# Patient Record
Sex: MALE | Race: WHITE | NOT HISPANIC OR LATINO | URBAN - METROPOLITAN AREA
[De-identification: names, ages, dates, MRNs, and addresses within clinical notes are randomized per-mention and may not be internally consistent; named-entity substitution may affect disease eponyms.]

---

## 2019-07-08 ENCOUNTER — OFFICE VISIT (OUTPATIENT)
Dept: INTERNAL MEDICINE CLINIC | Facility: CLINIC | Age: 19
End: 2019-07-08
Payer: COMMERCIAL

## 2019-07-08 VITALS
WEIGHT: 144 LBS | OXYGEN SATURATION: 98 % | SYSTOLIC BLOOD PRESSURE: 120 MMHG | DIASTOLIC BLOOD PRESSURE: 70 MMHG | HEART RATE: 92 BPM | BODY MASS INDEX: 21.82 KG/M2 | HEIGHT: 68 IN | TEMPERATURE: 98.3 F

## 2019-07-08 DIAGNOSIS — R59.9 LYMPH NODE ENLARGEMENT: ICD-10-CM

## 2019-07-08 DIAGNOSIS — Z23 NEED FOR DIPHTHERIA-TETANUS-PERTUSSIS (TDAP) VACCINE: Primary | ICD-10-CM

## 2019-07-08 PROCEDURE — 99202 OFFICE O/P NEW SF 15 MIN: CPT | Performed by: INTERNAL MEDICINE

## 2019-07-08 PROCEDURE — 1036F TOBACCO NON-USER: CPT | Performed by: INTERNAL MEDICINE

## 2019-07-08 PROCEDURE — 3008F BODY MASS INDEX DOCD: CPT | Performed by: INTERNAL MEDICINE

## 2019-07-08 NOTE — PROGRESS NOTES
Assessment/Plan:    Lymph node enlargement  Will check a CBC with differential, CMP and sed rate  Diagnoses and all orders for this visit:    Need for diphtheria-tetanus-pertussis (Tdap) vaccine    Lymph node enlargement  -     CBC and differential; Future  -     Comprehensive metabolic panel; Future  -     C-reactive protein; Future    Other orders  -     Cancel: TDAP VACCINE GREATER THAN OR EQUAL TO 8YO IM          Subjective:      Patient ID: Zee Farrell is a 22 y o  male  The patient presents for evaluation of a solitary left-sided, posterior cervical lymph node which has been present for many years  It has never been tender  It is not enlarging  The size waxes and wanes from time to time  Presently it is barely noticeable  The patient has not noted any other lymphadenopathy in the area  He denies any sore throats  He has had no earaches  He denies fevers  He denies night sweats  He has lost some weight over the past year but this was intentional   He denies any cough or chest pain  No abdominal discomfort, changes in bowel habits, nausea, vomiting or diarrhea  No joint pains  No myalgias  No change in appetite        Family History   Problem Relation Age of Onset    Hypertension Mother     Hashimoto's thyroiditis Mother     Anxiety disorder Mother     Fibromyalgia Mother     Asthma Mother     Asthma Father     Hyperlipidemia Father     Hypothyroidism Maternal Grandmother     Heart failure Maternal Grandmother     Vasculitis Maternal Grandmother     Hypertension Maternal Grandfather     Diabetes Maternal Grandfather     Diabetes Paternal Grandmother     Hypertension Paternal Grandmother     Cancer Paternal Grandfather         Unspecified     Social History     Socioeconomic History    Marital status: Single     Spouse name: Not on file    Number of children: Not on file    Years of education: Not on file    Highest education level: Not on file   Occupational History    Not on file   Social Needs    Financial resource strain: Not on file    Food insecurity:     Worry: Not on file     Inability: Not on file    Transportation needs:     Medical: Not on file     Non-medical: Not on file   Tobacco Use    Smoking status: Never Smoker    Smokeless tobacco: Never Used   Substance and Sexual Activity    Alcohol use: Never     Frequency: Never    Drug use: Never    Sexual activity: Not on file   Lifestyle    Physical activity:     Days per week: Not on file     Minutes per session: Not on file    Stress: Not on file   Relationships    Social connections:     Talks on phone: Not on file     Gets together: Not on file     Attends Bahai service: Not on file     Active member of club or organization: Not on file     Attends meetings of clubs or organizations: Not on file     Relationship status: Not on file    Intimate partner violence:     Fear of current or ex partner: Not on file     Emotionally abused: Not on file     Physically abused: Not on file     Forced sexual activity: Not on file   Other Topics Concern    Not on file   Social History Narrative    Not on file     History reviewed  No pertinent past medical history  No current outpatient medications on file  Allergies   Allergen Reactions    Cephalexin Hives     Past Surgical History:   Procedure Laterality Date    NO PAST SURGERIES           Review of Systems   Constitutional: Negative  HENT: Negative  Eyes: Negative  Respiratory: Negative  Cardiovascular: Negative  Gastrointestinal: Negative  Genitourinary: Negative  Musculoskeletal: Negative  Skin: Negative  Allergic/Immunologic: Negative  Neurological: Negative  Hematological: Positive for adenopathy (Solitary posterior cervical lymph node on the left side)  Psychiatric/Behavioral: Negative            Objective:      /70 (BP Location: Left arm, Patient Position: Sitting, Cuff Size: Standard)   Pulse 92 Temp 98 3 °F (36 8 °C) (Oral)   Ht 5' 8" (1 727 m)   Wt 65 3 kg (144 lb)   SpO2 98%   BMI 21 90 kg/m²          Physical Exam   Constitutional: He is oriented to person, place, and time  He appears well-developed and well-nourished  No distress  HENT:   Head: Normocephalic and atraumatic  Right Ear: External ear normal    Left Ear: External ear normal    Mouth/Throat: Oropharynx is clear and moist    Eyes: Pupils are equal, round, and reactive to light  Conjunctivae are normal  No scleral icterus  Neck: Neck supple  No JVD present  No tracheal deviation present  No thyromegaly present  Cardiovascular: Normal rate and regular rhythm  Pulmonary/Chest: Effort normal  No respiratory distress  Abdominal: Soft  He exhibits no distension  There is no tenderness  Musculoskeletal: Normal range of motion  He exhibits no edema or deformity  Lymphadenopathy:     He has cervical adenopathy (A solitary posterior cervical lymph node located at the scalp line on the left side  Lymph node approximately 5 mm in diameter)  Neurological: He is alert and oriented to person, place, and time  No focal deficits   Skin: Skin is warm and dry  He is not diaphoretic  Psychiatric: He has a normal mood and affect  His behavior is normal    Vitals reviewed

## 2020-03-31 ENCOUNTER — TELEMEDICINE (OUTPATIENT)
Dept: INTERNAL MEDICINE CLINIC | Facility: CLINIC | Age: 20
End: 2020-03-31
Payer: COMMERCIAL

## 2020-03-31 DIAGNOSIS — J45.20 MILD INTERMITTENT ASTHMA WITHOUT COMPLICATION: Primary | ICD-10-CM

## 2020-03-31 PROCEDURE — 99213 OFFICE O/P EST LOW 20 MIN: CPT | Performed by: INTERNAL MEDICINE

## 2020-03-31 PROCEDURE — 3008F BODY MASS INDEX DOCD: CPT | Performed by: INTERNAL MEDICINE

## 2020-03-31 RX ORDER — ALBUTEROL SULFATE 2.5 MG/3ML
2.5 SOLUTION RESPIRATORY (INHALATION) EVERY 6 HOURS PRN
Qty: 100 VIAL | Refills: 0 | Status: SHIPPED | OUTPATIENT
Start: 2020-03-31

## 2020-07-31 VITALS — WEIGHT: 144 LBS | BODY MASS INDEX: 21.82 KG/M2 | HEIGHT: 68 IN

## 2021-01-20 ENCOUNTER — TELEPHONE (OUTPATIENT)
Dept: INTERNAL MEDICINE CLINIC | Facility: CLINIC | Age: 21
End: 2021-01-20

## 2021-01-20 NOTE — TELEPHONE ENCOUNTER
Left message on machine for patient to call me at [place] office  patient due for a follow up for asthma        Please schedule with Dr Maurice Steward

## 2021-01-29 ENCOUNTER — OFFICE VISIT (OUTPATIENT)
Dept: INTERNAL MEDICINE CLINIC | Facility: CLINIC | Age: 21
End: 2021-01-29
Payer: COMMERCIAL

## 2021-01-29 VITALS
HEART RATE: 116 BPM | SYSTOLIC BLOOD PRESSURE: 150 MMHG | OXYGEN SATURATION: 99 % | BODY MASS INDEX: 22.88 KG/M2 | DIASTOLIC BLOOD PRESSURE: 70 MMHG | TEMPERATURE: 99 F | HEIGHT: 67 IN | WEIGHT: 145.8 LBS

## 2021-01-29 DIAGNOSIS — Z02.4 DRIVER'S PERMIT PHYSICAL EXAMINATION: Primary | ICD-10-CM

## 2021-01-29 DIAGNOSIS — R00.0 SINUS TACHYCARDIA: ICD-10-CM

## 2021-01-29 DIAGNOSIS — R03.0 ELEVATED BLOOD PRESSURE READING IN OFFICE WITHOUT DIAGNOSIS OF HYPERTENSION: ICD-10-CM

## 2021-01-29 PROCEDURE — 99173 VISUAL ACUITY SCREEN: CPT | Performed by: INTERNAL MEDICINE

## 2021-01-29 PROCEDURE — 99385 PREV VISIT NEW AGE 18-39: CPT | Performed by: INTERNAL MEDICINE

## 2021-01-29 NOTE — ASSESSMENT & PLAN NOTE
Patient does not have any physical condition that would prevent him from maintaining control of a motor vehicle  Patient noted to be mildly anxious  Blood pressure was elevated at 150/70  Tachycardic ranging 100 to 120   Otherwise asymptomatic

## 2021-01-29 NOTE — PROGRESS NOTES
Assessment/Plan:    's permit physical examination   Patient does not have any physical condition that would prevent him from maintaining control of a motor vehicle  Patient noted to be mildly anxious  Blood pressure was elevated at 150/70  Tachycardic ranging 100 to 120  Otherwise asymptomatic    Sinus tachycardia    Persistent tachycardia throughout the examination heart rate noted to be between 100 and 120    Elevated blood pressure reading in office without diagnosis of hypertension   Blood pressure reading repeated at several intervals consistently reading 150/70       Diagnoses and all orders for this visit:    's permit physical examination    Sinus tachycardia    Elevated blood pressure reading in office without diagnosis of hypertension          Subjective:      Patient ID: Davis Wan is a 23 y o  male  Patient presents for a 's permit examination  He offers no complaints  He has no medical history with conditions known to cause loss of consciousness nor does he have any other condition that would prevent him from controlling a motor vehicle  Denies chest pain  Denies palpitations  Denies dyspnea  No history of vertigo, headaches, visual disturbances, lightheadedness or syncope        Family History   Problem Relation Age of Onset    Hypertension Mother     Hashimoto's thyroiditis Mother     Anxiety disorder Mother     Fibromyalgia Mother     Asthma Mother     Asthma Father     Hyperlipidemia Father     Hypothyroidism Maternal Grandmother     Heart failure Maternal Grandmother     Vasculitis Maternal Grandmother     Hypertension Maternal Grandfather     Diabetes Maternal Grandfather     Diabetes Paternal Grandmother     Hypertension Paternal Grandmother     Cancer Paternal Grandfather         Unspecified     Social History     Socioeconomic History    Marital status: Single     Spouse name: Not on file    Number of children: Not on file    Years of education: Not on file    Highest education level: Not on file   Occupational History    Not on file   Social Needs    Financial resource strain: Not on file    Food insecurity     Worry: Not on file     Inability: Not on file    Transportation needs     Medical: Not on file     Non-medical: Not on file   Tobacco Use    Smoking status: Never Smoker    Smokeless tobacco: Never Used   Substance and Sexual Activity    Alcohol use: Never     Frequency: Never    Drug use: Never    Sexual activity: Not on file   Lifestyle    Physical activity     Days per week: Not on file     Minutes per session: Not on file    Stress: Not on file   Relationships    Social connections     Talks on phone: Not on file     Gets together: Not on file     Attends Confucianism service: Not on file     Active member of club or organization: Not on file     Attends meetings of clubs or organizations: Not on file     Relationship status: Not on file    Intimate partner violence     Fear of current or ex partner: Not on file     Emotionally abused: Not on file     Physically abused: Not on file     Forced sexual activity: Not on file   Other Topics Concern    Not on file   Social History Narrative    Not on file     Past Medical History:   Diagnosis Date    Allergic        Current Outpatient Medications:     albuterol (2 5 mg/3 mL) 0 083 % nebulizer solution, Take 1 vial (2 5 mg total) by nebulization every 6 (six) hours as needed for wheezing or shortness of breath, Disp: 100 vial, Rfl: 0  Allergies   Allergen Reactions    Cephalexin Hives     Past Surgical History:   Procedure Laterality Date    NO PAST SURGERIES           Review of Systems   Constitutional: Negative  HENT: Negative  Eyes: Negative  Respiratory: Negative  Cardiovascular: Negative  Gastrointestinal: Negative  Endocrine: Negative  Genitourinary: Negative  Musculoskeletal: Negative  Skin: Negative  Allergic/Immunologic: Negative  Neurological: Negative  Hematological: Negative  Psychiatric/Behavioral: Negative  Objective:      /70 (BP Location: Left arm, Patient Position: Supine, Cuff Size: Standard)   Pulse (!) 116   Temp 99 °F (37 2 °C) (Tympanic)   Ht 5' 7 25" (1 708 m)   Wt 66 1 kg (145 lb 12 8 oz)   SpO2 99% Comment: Room Air  BMI 22 67 kg/m²          Physical Exam  Vitals signs reviewed  Constitutional:       General: He is not in acute distress  Appearance: Normal appearance  He is normal weight  He is not ill-appearing or toxic-appearing  HENT:      Head: Normocephalic and atraumatic  Right Ear: External ear normal       Left Ear: External ear normal       Nose: Nose normal    Eyes:      General: No scleral icterus  Conjunctiva/sclera: Conjunctivae normal       Pupils: Pupils are equal, round, and reactive to light  Neck:      Musculoskeletal: Neck supple  No neck rigidity  Vascular: No carotid bruit  Cardiovascular:      Rate and Rhythm: Regular rhythm  Tachycardia present  Pulses: Normal pulses  Heart sounds: Normal heart sounds  No murmur  Pulmonary:      Effort: Pulmonary effort is normal  No respiratory distress  Breath sounds: Normal breath sounds  Abdominal:      General: Abdomen is flat  Bowel sounds are normal  There is no distension  Tenderness: There is no abdominal tenderness  There is no guarding  Musculoskeletal:         General: No swelling or tenderness  Right lower leg: No edema  Left lower leg: No edema  Lymphadenopathy:      Cervical: No cervical adenopathy  Skin:     General: Skin is warm  Capillary Refill: Capillary refill takes less than 2 seconds  Coloration: Skin is not jaundiced  Findings: No erythema or rash  Neurological:      General: No focal deficit present  Mental Status: He is alert and oriented to person, place, and time  Mental status is at baseline     Psychiatric:         Mood and Affect: Mood normal          Behavior: Behavior normal          Thought Content:  Thought content normal          Judgment: Judgment normal

## 2022-08-12 ENCOUNTER — TELEPHONE (OUTPATIENT)
Dept: INTERNAL MEDICINE CLINIC | Facility: CLINIC | Age: 22
End: 2022-08-12

## 2022-08-12 NOTE — TELEPHONE ENCOUNTER
Patient would like to seen for Virtual appt for COVID symptoms, I advised him that there weren't any appointments today but he is more than welcome to be seen by an urgent care or emergency room  As patient's parents wanted a script to be sent for RX paxloid  Which I did advised them patient must be evaluated before prescribing anything

## 2022-08-12 NOTE — TELEPHONE ENCOUNTER
I discussed with his mother, patient would need to have an appointment to evaluate his symptoms as well as if he would be a candidate for the Paxlovid  There are no appointments available this afternoon, he is still within his 5 days on Monday if he would be a candidate    Advised to follow-up with urgent care or the ED if symptoms worsen over the weekend

## 2022-10-12 PROBLEM — Z02.4 DRIVER'S PERMIT PHYSICAL EXAMINATION: Status: RESOLVED | Noted: 2021-01-29 | Resolved: 2022-10-12

## 2023-04-28 ENCOUNTER — TRANSITIONAL CARE MANAGEMENT (OUTPATIENT)
Dept: INTERNAL MEDICINE CLINIC | Facility: OTHER | Age: 23
End: 2023-04-28

## 2023-04-28 ENCOUNTER — TELEPHONE (OUTPATIENT)
Dept: INTERNAL MEDICINE CLINIC | Facility: OTHER | Age: 23
End: 2023-04-28

## 2023-05-30 ENCOUNTER — OFFICE VISIT (OUTPATIENT)
Dept: INTERNAL MEDICINE CLINIC | Facility: CLINIC | Age: 23
End: 2023-05-30

## 2023-05-30 VITALS
WEIGHT: 141.6 LBS | HEART RATE: 128 BPM | SYSTOLIC BLOOD PRESSURE: 140 MMHG | DIASTOLIC BLOOD PRESSURE: 80 MMHG | OXYGEN SATURATION: 99 % | HEIGHT: 68 IN | TEMPERATURE: 99.3 F | BODY MASS INDEX: 21.46 KG/M2

## 2023-05-30 DIAGNOSIS — K21.9 GASTROESOPHAGEAL REFLUX DISEASE WITHOUT ESOPHAGITIS: ICD-10-CM

## 2023-05-30 DIAGNOSIS — R59.9 LYMPH NODE ENLARGEMENT: ICD-10-CM

## 2023-05-30 DIAGNOSIS — R00.0 SINUS TACHYCARDIA: Primary | ICD-10-CM

## 2023-05-30 DIAGNOSIS — R03.0 ELEVATED BLOOD PRESSURE READING IN OFFICE WITHOUT DIAGNOSIS OF HYPERTENSION: ICD-10-CM

## 2023-05-30 RX ORDER — HYDROXYZINE HYDROCHLORIDE 25 MG/1
25 TABLET, FILM COATED ORAL EVERY 6 HOURS PRN
Qty: 10 TABLET | Refills: 0 | Status: SHIPPED | OUTPATIENT
Start: 2023-05-30

## 2023-05-30 RX ORDER — FAMOTIDINE 20 MG/1
20 TABLET, FILM COATED ORAL 2 TIMES DAILY
Qty: 60 TABLET | Refills: 0 | Status: SHIPPED | OUTPATIENT
Start: 2023-05-30

## 2023-05-30 RX ORDER — LORAZEPAM 0.5 MG/1
0.5 TABLET ORAL EVERY 8 HOURS PRN
COMMUNITY
Start: 2023-04-27 | End: 2023-05-30

## 2023-05-30 NOTE — PROGRESS NOTES
Assessment/Plan:    Lymph node enlargement  Will get blood work and [address]  Sinus tachycardia  Continues with tachycardia, has seen cardiology  Had echo stress test and 48 hour Holter monitor  Consider beta blocker  Will repeat blood work  Start Atarax as needed for anxiety  Elevated blood pressure reading in office without diagnosis of hypertension  Continue to monitor, may consider beta blocker  Diagnoses and all orders for this visit:    Sinus tachycardia  -     CBC and differential  -     Comprehensive metabolic panel; Future  -     Lipid panel  -     TSH, 3rd generation with Free T4 reflex  -     Hemoglobin A1C  -     Thyroid Antibodies Panel; Future  -     hydrOXYzine HCL (ATARAX) 25 mg tablet; Take 1 tablet (25 mg total) by mouth every 6 (six) hours as needed for anxiety    Lymph node enlargement  -     US head neck soft tissue; Future    Gastroesophageal reflux disease without esophagitis  -     famotidine (PEPCID) 20 mg tablet; Take 1 tablet (20 mg total) by mouth 2 (two) times a day    Elevated blood pressure reading in office without diagnosis of hypertension    Other orders  -     Discontinue: LORazepam (ATIVAN) 0 5 mg tablet; Take 0 5 mg by mouth every 8 (eight) hours as needed          Subjective:      Patient ID: Rowan Vieira is a 20 y o  male  Patient presents today to follow-up from ED visit on 4/26, he was seen for chest discomfort and tachycardia  He has been since evaluated by cardiology in May, he had a stress test and Holter monitor and was advised to come back to cardiology only as needed  IMPRESSION of holtor monitor      Predominant rhythm was sinus rhythm with an average heart rate of 79 bpm    Rare PVC  Rare PACs  No symptoms were reported        He reports that he gets lightheaded or disoriented with just light exertion     Denies tobacco abuse   Denies alcohol abuse   Denies illegal drug abuse      The following portions of the patient's history were reviewed and updated as appropriate: allergies, current medications, past family history, past medical history, past social history, past surgical history and problem list     Review of Systems   Constitutional: Negative for activity change, appetite change, chills, diaphoresis and fever  HENT: Negative for congestion, ear discharge, ear pain, postnasal drip, rhinorrhea, sinus pressure, sinus pain and sore throat  Eyes: Negative for pain, discharge, itching and visual disturbance  Respiratory: Negative for cough, chest tightness, shortness of breath and wheezing  Cardiovascular: Negative for chest pain, palpitations and leg swelling  Gastrointestinal: Negative for abdominal pain, constipation, diarrhea, nausea and vomiting  Endocrine: Negative for polydipsia, polyphagia and polyuria  Genitourinary: Negative for difficulty urinating, dysuria and urgency  Musculoskeletal: Negative for arthralgias, back pain and neck pain  Skin: Negative for rash and wound  Neurological: Negative for dizziness, weakness, numbness and headaches           Past Medical History:   Diagnosis Date   • Allergic          Current Outpatient Medications:   •  albuterol (2 5 mg/3 mL) 0 083 % nebulizer solution, Take 1 vial (2 5 mg total) by nebulization every 6 (six) hours as needed for wheezing or shortness of breath, Disp: 100 vial, Rfl: 0  •  famotidine (PEPCID) 20 mg tablet, Take 1 tablet (20 mg total) by mouth 2 (two) times a day, Disp: 60 tablet, Rfl: 0  •  hydrOXYzine HCL (ATARAX) 25 mg tablet, Take 1 tablet (25 mg total) by mouth every 6 (six) hours as needed for anxiety, Disp: 10 tablet, Rfl: 0    Allergies   Allergen Reactions   • Cephalexin Hives       Social History   Past Surgical History:   Procedure Laterality Date   • NO PAST SURGERIES       Family History   Problem Relation Age of Onset   • Hypertension Mother    • Hashimoto's thyroiditis Mother    • Anxiety disorder Mother    • Fibromyalgia Mother    • "Asthma Mother    • Asthma Father    • Hyperlipidemia Father    • Hypothyroidism Maternal Grandmother    • Heart failure Maternal Grandmother    • Vasculitis Maternal Grandmother    • Hypertension Maternal Grandfather    • Diabetes Maternal Grandfather    • Diabetes Paternal Grandmother    • Hypertension Paternal Grandmother    • Cancer Paternal Grandfather         Unspecified       Objective:  /80 (BP Location: Left arm, Patient Position: Sitting, Cuff Size: Standard)   Pulse (!) 128   Temp 99 3 °F (37 4 °C) (Temporal)   Ht 5' 7 6\" (1 717 m)   Wt 64 2 kg (141 lb 9 6 oz)   SpO2 99%   BMI 21 79 kg/m²     No results found for this or any previous visit (from the past 1344 hour(s))  Physical Exam  Constitutional:       General: He is not in acute distress  Appearance: He is well-developed  He is not diaphoretic  HENT:      Head: Normocephalic and atraumatic  Right Ear: External ear normal       Left Ear: External ear normal       Nose: Nose normal       Mouth/Throat:      Pharynx: No oropharyngeal exudate  Eyes:      General:         Right eye: No discharge  Left eye: No discharge  Conjunctiva/sclera: Conjunctivae normal       Pupils: Pupils are equal, round, and reactive to light  Neck:      Thyroid: No thyromegaly  Cardiovascular:      Rate and Rhythm: Normal rate and regular rhythm  Heart sounds: Normal heart sounds  No murmur heard  No friction rub  No gallop  Pulmonary:      Effort: Pulmonary effort is normal  No respiratory distress  Breath sounds: Normal breath sounds  No stridor  No wheezing or rales  Abdominal:      General: Bowel sounds are normal  There is no distension  Palpations: Abdomen is soft  Tenderness: There is no abdominal tenderness  Musculoskeletal:      Cervical back: Normal range of motion and neck supple  Lymphadenopathy:      Cervical: No cervical adenopathy  Skin:     General: Skin is warm and dry        " Findings: No erythema or rash  Neurological:      Mental Status: He is alert and oriented to person, place, and time  Psychiatric:         Behavior: Behavior normal          Thought Content:  Thought content normal          Judgment: Judgment normal

## 2023-05-30 NOTE — ASSESSMENT & PLAN NOTE
Continues with tachycardia, has seen cardiology  Had echo stress test and 48 hour Holter monitor  Consider beta blocker  Will repeat blood work  Start Atarax as needed for anxiety

## 2023-06-02 LAB — HBA1C MFR BLD HPLC: 4.5 %

## 2023-06-05 ENCOUNTER — TELEPHONE (OUTPATIENT)
Dept: INTERNAL MEDICINE CLINIC | Facility: CLINIC | Age: 23
End: 2023-06-05

## 2023-06-05 DIAGNOSIS — R00.0 SINUS TACHYCARDIA: Primary | ICD-10-CM

## 2023-06-05 RX ORDER — PROPRANOLOL HYDROCHLORIDE 20 MG/1
20 TABLET ORAL EVERY 12 HOURS SCHEDULED
Qty: 60 TABLET | Refills: 0 | Status: SHIPPED | OUTPATIENT
Start: 2023-06-05 | End: 2023-06-13 | Stop reason: SDUPTHER

## 2023-06-05 NOTE — TELEPHONE ENCOUNTER
Patient had episode of tachycardia on 6/3/23   during the day and 120 at night, he could no sleep  He had no stress or anxiety that could have triggered this episode  He would like a Beta Blocker to take as needed  Labs done at [facility] 6/2/23

## 2023-06-13 DIAGNOSIS — R00.0 SINUS TACHYCARDIA: ICD-10-CM

## 2023-06-13 RX ORDER — PROPRANOLOL HYDROCHLORIDE 20 MG/1
20 TABLET ORAL EVERY 12 HOURS SCHEDULED
Qty: 20 TABLET | Refills: 0 | Status: SHIPPED | OUTPATIENT
Start: 2023-06-13

## 2023-06-13 NOTE — TELEPHONE ENCOUNTER
Rx refill    NOV - 6/30/2023     Patient's mother called stating they forgot patient's medication at home and wanted to see if they can get a small refill just in case  States they [place] going [place]   Want to send it to     [address], [address]

## 2023-06-30 ENCOUNTER — OFFICE VISIT (OUTPATIENT)
Dept: INTERNAL MEDICINE CLINIC | Facility: CLINIC | Age: 23
End: 2023-06-30
Payer: COMMERCIAL

## 2023-06-30 VITALS
WEIGHT: 138.2 LBS | DIASTOLIC BLOOD PRESSURE: 94 MMHG | HEIGHT: 68 IN | TEMPERATURE: 98.7 F | SYSTOLIC BLOOD PRESSURE: 124 MMHG | OXYGEN SATURATION: 99 % | HEART RATE: 80 BPM | BODY MASS INDEX: 20.95 KG/M2

## 2023-06-30 DIAGNOSIS — R17 ELEVATED BILIRUBIN: Primary | ICD-10-CM

## 2023-06-30 DIAGNOSIS — K21.9 GASTROESOPHAGEAL REFLUX DISEASE WITHOUT ESOPHAGITIS: ICD-10-CM

## 2023-06-30 DIAGNOSIS — R00.0 SINUS TACHYCARDIA: ICD-10-CM

## 2023-06-30 NOTE — ASSESSMENT & PLAN NOTE
Continues with tachycardia, has seen cardiology  Had echo stress test and 48 hour Holter monitor  Consider beta blocker  Recommend follow up with cardiology  Start Atarax as needed for anxiety

## 2023-06-30 NOTE — PROGRESS NOTES
Assessment/Plan:    Sinus tachycardia  Continues with tachycardia, has seen cardiology  Had echo stress test and 48 hour Holter monitor  Consider beta blocker  Recommend follow up with cardiology  Start Atarax as needed for anxiety  Elevated bilirubin  US abdomin  Referral to GI   Continue pepcid               Diagnoses and all orders for this visit:    Elevated bilirubin  -     US abdomen complete; Future    Gastroesophageal reflux disease without esophagitis  -     Ambulatory Referral to Gastroenterology; Future    Sinus tachycardia  -     Ambulatory Referral to Cardiology; Future          Subjective:      Patient ID: Chester Jacob is a 20 y o  male  Patient presents today for follow-up on tachycardia  Reviewed blood work also reviewed ultrasound  He reports the past 4 days he has been feeling a little bit better, he has only started Pepcid has not taking propanolol or Atarax  He reports that his heart rate increases after he eats    Note from last office visit:  Patient presents today to follow-up from ED visit on 4/26, he was seen for chest discomfort and tachycardia  He has been since evaluated by cardiology in May, he had a stress test and Holter monitor and was advised to come back to cardiology only as needed  IMPRESSION of holtor monitor      Predominant rhythm was sinus rhythm with an average heart rate of 79 bpm    Rare PVC  Rare PACs  No symptoms were reported  He reports that he gets lightheaded or disoriented with just light exertion     Denies tobacco abuse   Denies alcohol abuse   Denies illegal drug abuse          The following portions of the patient's history were reviewed and updated as appropriate: allergies, current medications, past family history, past medical history, past social history, past surgical history and problem list     Review of Systems   Constitutional: Negative for activity change, appetite change, chills, diaphoresis and fever     HENT: Negative for congestion, ear discharge, ear pain, postnasal drip, rhinorrhea, sinus pressure, sinus pain and sore throat  Eyes: Negative for pain, discharge, itching and visual disturbance  Respiratory: Negative for cough, chest tightness, shortness of breath and wheezing  Cardiovascular: Negative for chest pain, palpitations and leg swelling  Gastrointestinal: Negative for abdominal pain, constipation, diarrhea, nausea and vomiting  Endocrine: Negative for polydipsia, polyphagia and polyuria  Genitourinary: Negative for difficulty urinating, dysuria and urgency  Musculoskeletal: Negative for arthralgias, back pain and neck pain  Skin: Negative for rash and wound  Neurological: Negative for dizziness, weakness, numbness and headaches           Past Medical History:   Diagnosis Date   • Allergic          Current Outpatient Medications:   •  albuterol (2 5 mg/3 mL) 0 083 % nebulizer solution, Take 1 vial (2 5 mg total) by nebulization every 6 (six) hours as needed for wheezing or shortness of breath, Disp: 100 vial, Rfl: 0  •  famotidine (PEPCID) 20 mg tablet, Take 1 tablet (20 mg total) by mouth 2 (two) times a day, Disp: 60 tablet, Rfl: 0  •  hydrOXYzine HCL (ATARAX) 25 mg tablet, Take 1 tablet (25 mg total) by mouth every 6 (six) hours as needed for anxiety, Disp: 10 tablet, Rfl: 0  •  propranolol (INDERAL) 20 mg tablet, Take 1 tablet (20 mg total) by mouth every 12 (twelve) hours, Disp: 20 tablet, Rfl: 0    Allergies   Allergen Reactions   • Cephalexin Hives       Social History   Past Surgical History:   Procedure Laterality Date   • NO PAST SURGERIES       Family History   Problem Relation Age of Onset   • Hypertension Mother    • Hashimoto's thyroiditis Mother    • Anxiety disorder Mother    • Fibromyalgia Mother    • Asthma Mother    • Asthma Father    • Hyperlipidemia Father    • Hypothyroidism Maternal Grandmother    • Heart failure Maternal Grandmother    • Vasculitis Maternal Grandmother "   • Hypertension Maternal Grandfather    • Diabetes Maternal Grandfather    • Diabetes Paternal Grandmother    • Hypertension Paternal Grandmother    • Cancer Paternal Grandfather         Unspecified       Objective:  /94 (BP Location: Left arm, Patient Position: Sitting, Cuff Size: Standard)   Pulse 80   Temp 98 7 °F (37 1 °C) (Temporal)   Ht 5' 7 91\" (1 725 m)   Wt 62 7 kg (138 lb 3 2 oz)   SpO2 99%   BMI 21 07 kg/m²     Recent Results (from the past 1344 hour(s))   Hemoglobin A1C    Collection Time: 06/02/23 12:00 AM   Result Value Ref Range    Hemoglobin A1C 4 5             Physical Exam  Constitutional:       General: He is not in acute distress  Appearance: He is well-developed  He is not diaphoretic  HENT:      Head: Normocephalic and atraumatic  Right Ear: External ear normal       Left Ear: External ear normal       Nose: Nose normal       Mouth/Throat:      Pharynx: No oropharyngeal exudate  Eyes:      General:         Right eye: No discharge  Left eye: No discharge  Conjunctiva/sclera: Conjunctivae normal       Pupils: Pupils are equal, round, and reactive to light  Neck:      Thyroid: No thyromegaly  Cardiovascular:      Rate and Rhythm: Normal rate and regular rhythm  Heart sounds: Normal heart sounds  No murmur heard  No friction rub  No gallop  Comments: Heart rate is noted to increase when going from laying to sitting  Pulmonary:      Effort: Pulmonary effort is normal  No respiratory distress  Breath sounds: Normal breath sounds  No stridor  No wheezing or rales  Abdominal:      General: Bowel sounds are normal  There is no distension  Palpations: Abdomen is soft  Tenderness: There is no abdominal tenderness  Musculoskeletal:      Cervical back: Normal range of motion and neck supple  Lymphadenopathy:      Cervical: No cervical adenopathy  Skin:     General: Skin is warm and dry  Findings: No erythema or rash   " Neurological:      Mental Status: He is alert and oriented to person, place, and time  Psychiatric:         Behavior: Behavior normal          Thought Content:  Thought content normal          Judgment: Judgment normal

## 2023-07-31 ENCOUNTER — OFFICE VISIT (OUTPATIENT)
Dept: INTERNAL MEDICINE CLINIC | Facility: OTHER | Age: 23
End: 2023-07-31
Payer: COMMERCIAL

## 2023-07-31 VITALS
SYSTOLIC BLOOD PRESSURE: 130 MMHG | BODY MASS INDEX: 20 KG/M2 | WEIGHT: 132 LBS | DIASTOLIC BLOOD PRESSURE: 92 MMHG | TEMPERATURE: 99.5 F | HEART RATE: 82 BPM | OXYGEN SATURATION: 96 % | HEIGHT: 68 IN

## 2023-07-31 DIAGNOSIS — R00.0 SINUS TACHYCARDIA: ICD-10-CM

## 2023-07-31 DIAGNOSIS — Z86.69 HX OF MIGRAINES: Primary | ICD-10-CM

## 2023-07-31 DIAGNOSIS — K21.9 GASTROESOPHAGEAL REFLUX DISEASE WITHOUT ESOPHAGITIS: ICD-10-CM

## 2023-07-31 PROCEDURE — 99214 OFFICE O/P EST MOD 30 MIN: CPT | Performed by: NURSE PRACTITIONER

## 2023-07-31 RX ORDER — FAMOTIDINE 20 MG/1
20 TABLET, FILM COATED ORAL 2 TIMES DAILY
Qty: 60 TABLET | Refills: 5 | Status: SHIPPED | OUTPATIENT
Start: 2023-07-31

## 2023-07-31 RX ORDER — PROPRANOLOL HYDROCHLORIDE 20 MG/1
20 TABLET ORAL EVERY 12 HOURS SCHEDULED
Qty: 90 TABLET | Refills: 0 | Status: SHIPPED | OUTPATIENT
Start: 2023-07-31

## 2023-07-31 NOTE — PROGRESS NOTES
Assessment/Plan:    Sinus tachycardia  Continues with tachycardia, has seen cardiology. Had echo stress test and 48 hour Holter monitor. Start beta blocker once daily. Recommend follow up with cardiology. Referral to neurology. Start Atarax as needed for anxiety. Diagnoses and all orders for this visit:    Hx of migraines  -     Ambulatory Referral to Neurology; Future    Gastroesophageal reflux disease without esophagitis  -     famotidine (PEPCID) 20 mg tablet; Take 1 tablet (20 mg total) by mouth 2 (two) times a day    Sinus tachycardia  -     propranolol (INDERAL) 20 mg tablet; Take 1 tablet (20 mg total) by mouth every 12 (twelve) hours          Subjective:      Patient ID: Kristin Rodriguez is a 20 y.o. male. Patient presents today for follow-up on tachycardia. He reports that his heart rate increases after he eats    Note from last office visit:  Patient presents today to follow-up from ED visit on 4/26, he was seen for chest discomfort and tachycardia. He has been since evaluated by cardiology in May, he had a stress test and Holter monitor and was advised to come back to cardiology only as needed. IMPRESSION of holtor monitor.     Predominant rhythm was sinus rhythm with an average heart rate of 79 bpm.   Rare PVC. Rare PACs. No symptoms were reported. He reports that he gets lightheaded or disoriented with just light exertion     Denies tobacco abuse   Denies alcohol abuse   Denies illegal drug abuse    7/31/23: Patient reports that his heart rate has been elevated, HR 160s on Thursday, he took propranolol and this helped with his HR, he reports durning this time he felt head pressure and neck pressure.    Has seen ENT on 7/17: it was felt he may have eustachian tube dysfunction which would also explain neck pressure as well as his complaint of imbalance, he was advised to start flonase and follow up in two weeks     He was seen by cardiology; did not think it was a primary cardiac issue. Second opinion with cardiology scheduled at [facility]. Patient has Inderal at home and just started to take this medciation daily and reports he is feeling better. The following portions of the patient's history were reviewed and updated as appropriate: allergies, current medications, past family history, past medical history, past social history, past surgical history and problem list.    Review of Systems   Constitutional: Negative for activity change, appetite change, chills, diaphoresis and fever. HENT: Negative for congestion, ear discharge, ear pain, postnasal drip, rhinorrhea, sinus pressure, sinus pain and sore throat. Eyes: Negative for pain, discharge, itching and visual disturbance. Respiratory: Negative for cough, chest tightness, shortness of breath and wheezing. Cardiovascular: Negative for chest pain, palpitations and leg swelling. Gastrointestinal: Negative for abdominal pain, constipation, diarrhea, nausea and vomiting. Endocrine: Negative for polydipsia, polyphagia and polyuria. Genitourinary: Negative for difficulty urinating, dysuria and urgency. Musculoskeletal: Negative for arthralgias, back pain and neck pain. Skin: Negative for rash and wound. Neurological: Negative for dizziness, weakness, numbness and headaches.          Past Medical History:   Diagnosis Date   • Allergic          Current Outpatient Medications:   •  albuterol (2.5 mg/3 mL) 0.083 % nebulizer solution, Take 1 vial (2.5 mg total) by nebulization every 6 (six) hours as needed for wheezing or shortness of breath, Disp: 100 vial, Rfl: 0  •  famotidine (PEPCID) 20 mg tablet, Take 1 tablet (20 mg total) by mouth 2 (two) times a day, Disp: 60 tablet, Rfl: 5  •  hydrOXYzine HCL (ATARAX) 25 mg tablet, Take 1 tablet (25 mg total) by mouth every 6 (six) hours as needed for anxiety, Disp: 10 tablet, Rfl: 0  •  propranolol (INDERAL) 20 mg tablet, Take 1 tablet (20 mg total) by mouth every 12 (twelve) hours, Disp: 90 tablet, Rfl: 0    Allergies   Allergen Reactions   • Cephalexin Hives       Social History   Past Surgical History:   Procedure Laterality Date   • NO PAST SURGERIES     • WISDOM TOOTH EXTRACTION       Family History   Problem Relation Age of Onset   • Hypertension Mother    • Hashimoto's thyroiditis Mother    • Anxiety disorder Mother    • Fibromyalgia Mother    • Asthma Mother    • Asthma Father    • Hyperlipidemia Father    • Hypothyroidism Maternal Grandmother    • Heart failure Maternal Grandmother    • Vasculitis Maternal Grandmother    • Hypertension Maternal Grandfather    • Diabetes Maternal Grandfather    • Diabetes Paternal Grandmother    • Hypertension Paternal Grandmother    • Cancer Paternal Grandfather         Unspecified       Objective:  /92 (BP Location: Left arm, Patient Position: Sitting, Cuff Size: Standard)   Pulse 82   Temp 99.5 °F (37.5 °C) (Temporal)   Ht 5' 7.6" (1.717 m)   Wt 59.9 kg (132 lb)   SpO2 96%   BMI 20.31 kg/m²     No results found for this or any previous visit (from the past 1344 hour(s)). Physical Exam  Constitutional:       General: He is not in acute distress. Appearance: He is well-developed. He is not diaphoretic. HENT:      Head: Normocephalic and atraumatic. Right Ear: External ear normal.      Left Ear: External ear normal.      Nose: Nose normal.      Mouth/Throat:      Pharynx: No oropharyngeal exudate. Eyes:      General:         Right eye: No discharge. Left eye: No discharge. Conjunctiva/sclera: Conjunctivae normal.      Pupils: Pupils are equal, round, and reactive to light. Neck:      Thyroid: No thyromegaly. Cardiovascular:      Rate and Rhythm: Normal rate and regular rhythm. Heart sounds: Normal heart sounds. No murmur heard. No friction rub. No gallop. Pulmonary:      Effort: Pulmonary effort is normal. No respiratory distress.       Breath sounds: Normal breath sounds. No stridor. No wheezing or rales. Abdominal:      General: Bowel sounds are normal. There is no distension. Palpations: Abdomen is soft. Tenderness: There is no abdominal tenderness. Musculoskeletal:      Cervical back: Normal range of motion and neck supple. Lymphadenopathy:      Cervical: No cervical adenopathy. Skin:     General: Skin is warm and dry. Findings: No erythema or rash. Neurological:      Mental Status: He is alert and oriented to person, place, and time. Psychiatric:         Behavior: Behavior normal.         Thought Content:  Thought content normal.         Judgment: Judgment normal.

## 2023-08-01 NOTE — ASSESSMENT & PLAN NOTE
Continues with tachycardia, has seen cardiology. Had echo stress test and 48 hour Holter monitor. Start beta blocker once daily. Recommend follow up with cardiology. Referral to neurology. Start Atarax as needed for anxiety.

## 2023-08-02 ENCOUNTER — TELEPHONE (OUTPATIENT)
Dept: INTERNAL MEDICINE CLINIC | Facility: OTHER | Age: 23
End: 2023-08-02

## 2023-08-02 NOTE — TELEPHONE ENCOUNTER
Medical Records request received from [facility] and [address] [place] [address] (Scanned into Media for Review, Faxed to [facility])

## 2023-08-25 ENCOUNTER — TELEPHONE (OUTPATIENT)
Dept: INTERNAL MEDICINE CLINIC | Age: 23
End: 2023-08-25

## 2023-08-25 NOTE — TELEPHONE ENCOUNTER
Received call from patient's mother stating patient has a possible sinus infection and would like a medication to be sent to pharmacy. No appointments are available. Patient also wanted to make note that his heart rate is going up in the 130s, if a medication is going to be sent to be safe to used with his high heart rate. Patient does have an appointment scheduled in October with cardiology, and has seen a private doctor in [place] until then.

## 2023-10-10 ENCOUNTER — CONSULT (OUTPATIENT)
Dept: CARDIOLOGY CLINIC | Facility: CLINIC | Age: 23
End: 2023-10-10
Payer: COMMERCIAL

## 2023-10-10 VITALS
SYSTOLIC BLOOD PRESSURE: 134 MMHG | DIASTOLIC BLOOD PRESSURE: 88 MMHG | HEART RATE: 89 BPM | BODY MASS INDEX: 20.61 KG/M2 | HEIGHT: 68 IN | WEIGHT: 136 LBS | OXYGEN SATURATION: 96 %

## 2023-10-10 DIAGNOSIS — R00.0 SINUS TACHYCARDIA: ICD-10-CM

## 2023-10-10 DIAGNOSIS — Q67.6 PECTUS EXCAVATUM: Primary | ICD-10-CM

## 2023-10-10 PROCEDURE — 93000 ELECTROCARDIOGRAM COMPLETE: CPT | Performed by: INTERNAL MEDICINE

## 2023-10-10 PROCEDURE — 99204 OFFICE O/P NEW MOD 45 MIN: CPT | Performed by: INTERNAL MEDICINE

## 2023-10-10 NOTE — PROGRESS NOTES
[facility]  Office Consultation  Kd Rabago 20 y.o. male MRN: [id number]        Problems    1. Pectus excavatum        2. Sinus tachycardia  Ambulatory Referral to Cardiology    POCT ECG          Impression:      Pectus Excavatum  Sinus Tachycardia/palpitations  High normal HR since 2019 data  Symptoms started abruptly 24 hours after Tdap vaccine in April 2023  [facility] and [facility] cardiac testing normal except for sinus tachycardia  Symptoms much improved and slowly resolving. Plan    No symptoms historically to support MVP dx, no auscultation abnormality to support MVP, and [facility] echo did not suggest it, so highly doubt this diagnosis. Sinus tachycardia and palpitations largely resolving. I do suspect this may have been a vaccine reaction as abrupt as symptoms occurred. No further testing  Prn f.u      Reason for Consult / Principal Problem: palpitations, lightheaded    HPI: Kd Rabago is a 20y.o. year old male with pectus excavatum, doing online schooling, had a Tdap update 4/23, 24 hours later had significant lightheadedness, palpitations and exertional intolerance with heart rates upon standing and minimal exertion that were up to 130 140 bpm.  He tracked this on a Fitbit.   He went to the ER at [facility], underwent treadmill stress test, Holter, echo, all of which was normal but he did not exert himself during the Holter and had heart rates as high as 148 bpm.  He felt dismissed, went for second opinion at [facility], tells me that he was diagnosed with a possible mitral valve prolapse, but the remainder of his cardiac testing was normal.  He comes in now with 6 months in between the start of his symptoms and now, with significant improvement over time, but in tracking his heart rate data since 2019 he does have a high heart rate at baseline, typically in the 80s and 90s, 1 heart rate during a 2021 visit with his PCP was 116 bpm.  He had a normal TSH, normal D-dimer, normal CBC, normal drug screen, normal troponin, and his EKG today is normal with a baseline heart rate of 89. Review of Systems   Constitutional: Negative. HENT: Negative. Eyes: Negative. Respiratory: Negative. Cardiovascular:  Positive for palpitations. Gastrointestinal: Negative. Endocrine: Negative. Genitourinary: Negative. Musculoskeletal: Negative. Skin: Negative. Neurological:  Positive for light-headedness. Hematological: Negative. Psychiatric/Behavioral: Negative. Past Medical History:   Diagnosis Date   • Allergic      Past Surgical History:   Procedure Laterality Date   • NO PAST SURGERIES     • WISDOM TOOTH EXTRACTION       Social History     Substance and Sexual Activity   Alcohol Use Never     Social History     Substance and Sexual Activity   Drug Use Never     Social History     Tobacco Use   Smoking Status Never   Smokeless Tobacco Never     Family History   Problem Relation Age of Onset   • Hypertension Mother    • Hashimoto's thyroiditis Mother    • Anxiety disorder Mother    • Fibromyalgia Mother    • Asthma Mother    • Asthma Father    • Hyperlipidemia Father    • Hypothyroidism Maternal Grandmother    • Heart failure Maternal Grandmother    • Vasculitis Maternal Grandmother    • Hypertension Maternal Grandfather    • Diabetes Maternal Grandfather    • Diabetes Paternal Grandmother    • Hypertension Paternal Grandmother    • Cancer Paternal Grandfather         Unspecified       Allergies:   Allergies   Allergen Reactions   • Cephalexin Hives       Medications:     Current Outpatient Medications:   •  albuterol (2.5 mg/3 mL) 0.083 % nebulizer solution, Take 1 vial (2.5 mg total) by nebulization every 6 (six) hours as needed for wheezing or shortness of breath, Disp: 100 vial, Rfl: 0  •  famotidine (PEPCID) 20 mg tablet, Take 1 tablet (20 mg total) by mouth 2 (two) times a day, Disp: 60 tablet, Rfl: 5  •  hydrOXYzine HCL (ATARAX) 25 mg tablet, Take 1 tablet (25 mg total) by mouth every 6 (six) hours as needed for anxiety, Disp: 10 tablet, Rfl: 0  •  propranolol (INDERAL) 20 mg tablet, Take 1 tablet (20 mg total) by mouth every 12 (twelve) hours, Disp: 90 tablet, Rfl: 0      Vitals:    10/10/23 1118   BP: 134/88   Pulse: 89   SpO2: 96%     Weight (last 2 days)     Date/Time Weight    10/10/23 1118 61.7 (136)        Physical Exam  Constitutional:       General: He is not in acute distress. Appearance: Normal appearance. He is well-developed. He is not diaphoretic. HENT:      Head: Normocephalic and atraumatic. Eyes:      General: No scleral icterus. Conjunctiva/sclera: Conjunctivae normal.      Pupils: Pupils are equal, round, and reactive to light. Neck:      Thyroid: No thyromegaly. Vascular: No JVD. Cardiovascular:      Rate and Rhythm: Normal rate and regular rhythm. Heart sounds: Normal heart sounds. No murmur heard. No friction rub. No gallop. Pulmonary:      Effort: Pulmonary effort is normal. No respiratory distress. Breath sounds: Normal breath sounds. No wheezing or rales. Abdominal:      General: Bowel sounds are normal. There is no distension. Palpations: Abdomen is soft. Tenderness: There is no abdominal tenderness. Musculoskeletal:         General: Deformity (pectus excavatum) present. Normal range of motion. Cervical back: Normal range of motion and neck supple. Right lower leg: No edema. Left lower leg: No edema. Skin:     General: Skin is warm and dry. Findings: No erythema or rash. Neurological:      General: No focal deficit present. Mental Status: He is alert and oriented to person, place, and time. Cranial Nerves: No cranial nerve deficit. Motor: No weakness.            Laboratory Studies:    Laboratory studies personally reviewed    Cardiac testing:     EKG reviewed personally:   Results for orders placed or performed in visit on 10/10/23   POCT ECG    Impression    Sinus rhythm with sinus arrhythmia       Echocardiogram:  5/23 - [facility] - normal study  7/23 - [address] - Normal except possible MVP (study not available)    Stress test:  5/23 - [facility] - normal study    Holter:  5/23 - [facility], HR max 148, sinus tachycardia (in the absence of exercise during test)  otherwise normal    Cardiac catheterization:        Tran Portillo MD    Portions of the record may have been created with voice recognition software. Occasional wrong word or "sound a like" substitutions may have occurred due to the inherent limitations of voice recognition software. Read the chart carefully and recognize, using context, where substitutions have occurred.

## 2024-02-07 ENCOUNTER — TELEMEDICINE (OUTPATIENT)
Dept: INTERNAL MEDICINE CLINIC | Facility: OTHER | Age: 24
End: 2024-02-07
Payer: COMMERCIAL

## 2024-02-07 VITALS — WEIGHT: 135 LBS | HEIGHT: 67 IN | TEMPERATURE: 100 F | BODY MASS INDEX: 21.19 KG/M2

## 2024-02-07 DIAGNOSIS — U07.1 COVID-19: Primary | ICD-10-CM

## 2024-02-07 PROCEDURE — 99213 OFFICE O/P EST LOW 20 MIN: CPT | Performed by: NURSE PRACTITIONER

## 2024-02-28 ENCOUNTER — TELEPHONE (OUTPATIENT)
Dept: NEUROLOGY | Facility: CLINIC | Age: 24
End: 2024-02-28

## 2024-03-11 ENCOUNTER — TELEPHONE (OUTPATIENT)
Dept: NEUROLOGY | Facility: CLINIC | Age: 24
End: 2024-03-11

## 2024-03-20 ENCOUNTER — CONSULT (OUTPATIENT)
Dept: NEUROLOGY | Facility: CLINIC | Age: 24
End: 2024-03-20
Payer: COMMERCIAL

## 2024-03-20 VITALS
SYSTOLIC BLOOD PRESSURE: 130 MMHG | TEMPERATURE: 98 F | DIASTOLIC BLOOD PRESSURE: 84 MMHG | BODY MASS INDEX: 21.52 KG/M2 | WEIGHT: 137.4 LBS | HEART RATE: 86 BPM | OXYGEN SATURATION: 99 %

## 2024-03-20 DIAGNOSIS — M54.2 CERVICALGIA: ICD-10-CM

## 2024-03-20 DIAGNOSIS — R51.9 DAILY HEADACHE: Primary | ICD-10-CM

## 2024-03-20 DIAGNOSIS — R29.818 TRANSIENT NEUROLOGICAL SYMPTOMS: ICD-10-CM

## 2024-03-20 DIAGNOSIS — Z86.69 HX OF MIGRAINES: ICD-10-CM

## 2024-03-20 PROCEDURE — 99417 PROLNG OP E/M EACH 15 MIN: CPT

## 2024-03-20 PROCEDURE — 99245 OFF/OP CONSLTJ NEW/EST HI 55: CPT

## 2024-03-20 NOTE — PROGRESS NOTES
Review of Systems   Constitutional:  Negative for appetite change, fatigue and fever.   HENT: Negative.  Negative for hearing loss, tinnitus, trouble swallowing and voice change.    Eyes:  Positive for pain. Negative for photophobia and visual disturbance.   Respiratory: Negative.  Negative for shortness of breath.    Cardiovascular: Negative.  Negative for palpitations.   Gastrointestinal: Negative.  Negative for nausea and vomiting.   Endocrine: Negative.  Negative for cold intolerance.   Genitourinary: Negative.  Negative for dysuria, frequency and urgency.   Musculoskeletal:  Negative for back pain, gait problem, myalgias, neck pain and neck stiffness.   Skin: Negative.  Negative for rash.   Allergic/Immunologic: Negative.    Neurological:  Positive for light-headedness. Negative for dizziness, tremors, seizures, syncope, facial asymmetry, speech difficulty, weakness, numbness and headaches.   Hematological: Negative.  Does not bruise/bleed easily.   Psychiatric/Behavioral: Negative.  Negative for confusion, hallucinations and sleep disturbance.    All other systems reviewed and are negative.

## 2024-03-20 NOTE — PATIENT INSTRUCTIONS
Daily headaches and cervicalgia/transient neurologic symptoms:    -Moving forward, would like for the patient to have an MRI brain without contrast.  Like to evaluate the new daily headache/cervicalgia that he is currently experiencing at this point in time.  Also the transient neurologic symptoms of the imbalance that are also occurring with this significant head and neck pressure as well at this point in time.  Would like to make sure there are no other significant intracranial abnormalities that could be contributing to the patient's condition at this time.  -Did believe that a lot of the patient's symptoms at this point in time which include the head and neck pressure that he experiences, the imbalance difficulty, and also the increased heart rate may be correlated all towards a constant reminiscing or anxiety over the previous incident last year where he received the tetanus vaccine and ended up in the hospital with a lot of the symptoms that he is currently experiencing.  It is quite possible that going back and thinking about the incident again, subconsciously or consciously may be contributing to some of what he is feeling and why the symptoms seem to recur.  I would advise the patient from my perspective to continue to take the propranolol 20 mg twice daily as recommended by his primary care.  If it seems to help him control his heart rate to get back to his normal life and his daily functioning and I believe that that would be appropriate for him.  Just would advise the patient to keep track of his heart rate and also his blood pressure while taking the medication and making sure that the medication is not flaring up his asthma.  Personally, I also do not see any cardiology related reason why the patient has to take the propranolol but it may certainly help with some of the symptoms that he is experiencing at this time.  -Can further go over the symptoms that he is experiencing with his primary care  provider in regards to if there is any concern for an anxiety related syndrome/phobia in the future.  See if there is any  antidepressant/antianxiety lytic medication required for him moving forward.  -In regard to some of the normal headaches and migraines that he does experience, he notes the migraines occurring about once every other month.  He can continue with taking about 600 mg of ibuprofen or 1000 mg of extra strength Tylenol at the onset of the migraines.  If they seem to get worse over time he is to let me know and we can discuss further migraine management in the future.  -Recommend also if the patient's symptoms that he is experiencing now currently get worse or if he is experiencing more neck stiffness/tightness, he can reach out to me and we can look into options like physical therapy.  Also could look into additional testing if his symptoms do not seem to resolve with changes to his lifestyle and implementing the propranolol as a daily medication.    Patient should have follow-up in about 6 months time with José Miguel VEGA.  Any other questions or concerns the patient can call the office or send a message in through the [facility]'s [employer].  Once the patient's imaging is completed, we will leave a result note in the [employer] [facility].  If the imaging is abnormal, will have the nurses or myself contact the patient with the results of the imaging.

## 2024-03-20 NOTE — PROGRESS NOTES
[facility] Consult  PATIENT:  Lester Milner  MRN:  [id number]  :  2000  DATE OF SERVICE:  3/20/2024  REFERRED BY: Tete Nieves CR*  PMD: Renard Mascorro MD    Assessment/Plan:     Lester Milner is a very pleasant 23 y.o. male with a past medical history that includes sinus tachycardia, mild intermittent asthma, GERD, pectus excavatum referred here for evaluation of headache.    Initial evaluation 3/20/2024    Daily headaches and cervicalgia/transient neurologic symptoms:    I had the pleasure of evaluating Lester today in the office at [facility] in [place].  He is presenting today for an initial new patient consultation in regard to migraine headaches.  Although, the patient notes at this appointment that his migraine headaches are relatively under control.  He gets 1 significant migraine headache once every other month.  Usually the pain is around his left eye and is described as more of a stabbing type pain.  Patient is able to take ibuprofen or Tylenol which seems to relieve the migraine, and overall he is not too concerned about the migraines at this time.    With the patient is currently more concerned with our symptoms that started occurring last year at the same time he received a tetanus vaccine.  At around 2023, the patient received the tetanus vaccine.  Shortly after, the patient had some significant back of the head tightness and stiffness and also stiffness of the neck as well.  After this, the patient would feel the progression of symptoms of imbalance.  Where the patient would be having difficulty walking straight and stating that he could fall over at any time.  The patient had noted that as these episodes occurred he had never fallen over as of yet.  He would also note that his heart rate would become significantly elevated as this episode was occurring.  Initially, when the patient first had the  episode he went to the hospital and was evaluated for potentially having SVT.  Patient had received adenosine during the incident, and has had more extensive cardiac workup on the outpatient basis which has not revealed any thing significant for SVT or any other cardiac abnormalities.  The patient states that he does have mitral prolapse but no other significant cardiac abnormalities.  He notes that the same symptoms however will continue to occur again and again over time.  He notes that to some degree the symptoms are happening almost every day.  He will generally have the progression of feeling the head and neck stiffness/tightness, then having the imbalance, then having the increased heart rate afterwards.  From what the patient describes, I did not think that this seemed to related to one particular neurologic condition.  Although with the new onset headaches and head pressure that is unrelated to his migraines, would be a good idea to get an MRI brain without contrast.  I did feel as though since the patient had this initial incidence occurring after the tetanus vaccine, that this may be more related to a potential stress induced reaction that is contributing to the patient having repeat symptoms over and over again.  Certainly something like an anxiety or a phobia around this condition could certainly contribute to the patient's increase in heart rate and also manifest the same symptoms again.  It was noted that the patient's head and neck area was slightly tight/stiff from a musculoskeletal standpoint.  Inconclusive on whether the patient really needs to see physical therapy at this time, but did advise him on neck stretching.  It has been noted by the patient that over the last year, this incidence has really hampered him from participating in his day-to-day life.  However, I did encourage the patient to maybe continue to take the propranolol on a twice daily basis and try to get back to some head and  normalcy in his life as he has been concerned of doing any type of activity over the last year for the front of him having an increased or elevated heart rate.    -Moving forward, would like for the patient to have an MRI brain without contrast.  Like to evaluate the new daily headache/cervicalgia that he is currently experiencing at this point in time.  Also the transient neurologic symptoms of the imbalance that are also occurring with this significant head and neck pressure as well at this point in time.  Would like to make sure there are no other significant intracranial abnormalities that could be contributing to the patient's condition at this time.  -Did believe that a lot of the patient's symptoms at this point in time which include the head and neck pressure that he experiences, the imbalance difficulty, and also the increased heart rate may be correlated all towards a constant reminiscing or anxiety over the previous incident last year where he received the tetanus vaccine and ended up in the hospital with a lot of the symptoms that he is currently experiencing.  It is quite possible that going back and thinking about the incident again, subconsciously or consciously may be contributing to some of what he is feeling and why the symptoms seem to recur.  I would advise the patient from my perspective to continue to take the propranolol 20 mg twice daily as recommended by his primary care.  If it seems to help him control his heart rate to get back to his normal life and his daily functioning and I believe that that would be appropriate for him.  Just would advise the patient to keep track of his heart rate and also his blood pressure while taking the medication and making sure that the medication is not flaring up his asthma.  Personally, I also do not see any cardiology related reason why the patient has to take the propranolol but it may certainly help with some of the symptoms that he is experiencing at  this time.  -Can further go over the symptoms that he is experiencing with his primary care provider in regards to if there is any concern for an anxiety related syndrome/phobia in the future.  See if there is any  antidepressant/antianxiety lytic medication required for him moving forward.  -In regard to some of the normal headaches and migraines that he does experience, he notes the migraines occurring about once every other month.  He can continue with taking about 600 mg of ibuprofen or 1000 mg of extra strength Tylenol at the onset of the migraines.  If they seem to get worse over time he is to let me know and we can discuss further migraine management in the future.  -Recommend also if the patient's symptoms that he is experiencing now currently get worse or if he is experiencing more neck stiffness/tightness, he can reach out to me and we can look into options like physical therapy.  Also could look into additional testing if his symptoms do not seem to resolve with changes to his lifestyle and implementing the propranolol as a daily medication.    Patient should have follow-up in about 6 months time with José Miguel VEGA.  Any other questions or concerns the patient can call the office or send a message in through the [facility] [facility].  Once the patient's imaging is completed, we will leave a result note in the [email or web address].  If the imaging is abnormal, will have the nurses or myself contact the patient with the results of the imaging.       CC:   We had the pleasure of evaluating Lester Milner in neurological consultation today. Lester Milner is a  23 y.o. male who presents today for evaluation of headaches.     History obtained from patient as well as available medical record review.  History of Present Illness:   Current medical illnesses  or past medical history include sinus tachycardia, mild intermittent asthma, GERD, pectus excavatum      Interval History:    Once every other month for  migraine frequency. Left sided, stabbing through the left eye pain.  Usually able to take a Tylenol or ibuprofen and the pain is relieved.  Not as significantly concerned with the migraine headaches compared to what else has been going on recently.    As of April of 2023, ended up getting a Tetanus shot.  Afterwards, the patient had a reaction of neck stiffness, back of head pressure, dizziness, and heart rate increased.  Presented to the hospital and the patient's episode was presumed to be related to SVT.  It was later discovered that the patient did not have SVT, received adenosine in the hospital as well.  Patient had further cardiology outpatient workup which did not seem to reveal any significant SVT, patient does have mitral valve prolapse.  No other significant cardiac arrhythmias or abnormalities, noted that the patient does have sinus tachycardia though.  One of the patient's episodes that seems to recur on a daily basis will consist of him having a head pain and neck stiffness/tightness.  Afterwards, the patient will then have lightheadedness/imbalance as well with this event going on. Does seem to think about this event occurring almost everyday, does not feel as though he anticipates it everyday. Does seem to vary in severity throughout the day att his time. Will not completely hamper him throughout the day. His heart rate does seem to be elevated after he eats as well. Currently taking propranolol 20 mg as needed he states.  He is not taking the medication every day because he did not feel it helped significantly with the symptoms.  Did seem to be helping with the heart rate but not every other symptom. More feeling imbalance instead of dizziness or vertigo. With the imbalance, does cause him to possibly fall or could potentially be tipped over and fall. Has not completely fallen over or passed out from this occurring. Sensation of someone pushing down on the back of the head and neck area. Not  experiencing any weakness or numbness of extremities with these episodes. Left eye lid twitching sometimes occurring during these episodes however.  The patient does not endorse any strong history of depression/anxiety or any other mental health conditions.  The patient feels it may be possible that he could be triggering the symptoms by anticipating when the event is going to occur.  The patient states that he does not have any significant issues with lack of sleep, he sleeps relatively well throughout the night.  Currently drinking a significant amount of water and usually not missing any meals.  He does note that his physical activity has been significantly hampered or limited since the symptoms started occurring last year.  Has a very difficult time with working out or just slight movement or activity due to his heart rate jumping significantly high.     Headaches started at what age? 11 years old  How often do the headaches occur?   - as of 3/20/2024: once every other month at this time.   How long do the headaches last?  Only for few hours, able to take ibuprofen or Tylenol and the pain is relieved.  Are you ever headache free? Yes     Where is your headache located and pain quality?  Usually around the area of the left eye, described as more of a stabbing type pain    Associated symptoms:   [x] Nausea    [x] Problems with concentration  [x] Photophobia      [x] Light-headed or dizzy           The following portions of the patient's history were reviewed and updated as appropriate: allergies, current medications, past family history, past medical history, past social history, past surgical history and problem list.      Past Medical History:     Past Medical History:   Diagnosis Date    Allergic        Patient Active Problem List   Diagnosis    Lymph node enlargement    Mild intermittent asthma without complication    Sinus tachycardia    Elevated blood pressure reading in office without diagnosis of  hypertension    Elevated bilirubin    Gastroesophageal reflux disease without esophagitis    Pectus excavatum       Medications:      Current Outpatient Medications   Medication Sig Dispense Refill    albuterol (2.5 mg/3 mL) 0.083 % nebulizer solution Take 1 vial (2.5 mg total) by nebulization every 6 (six) hours as needed for wheezing or shortness of breath 100 vial 0    famotidine (PEPCID) 20 mg tablet Take 1 tablet (20 mg total) by mouth 2 (two) times a day 60 tablet 5    hydrOXYzine HCL (ATARAX) 25 mg tablet Take 1 tablet (25 mg total) by mouth every 6 (six) hours as needed for anxiety 10 tablet 0    propranolol (INDERAL) 20 mg tablet Take 1 tablet (20 mg total) by mouth every 12 (twelve) hours 90 tablet 0     No current facility-administered medications for this visit.        Allergies:      Allergies   Allergen Reactions    Cephalexin Hives       Family History:     Family History   Problem Relation Age of Onset    Hypertension Mother     Hashimoto's thyroiditis Mother     Anxiety disorder Mother     Fibromyalgia Mother     Asthma Mother     Asthma Father     Hyperlipidemia Father     Hypothyroidism Maternal Grandmother     Heart failure Maternal Grandmother     Vasculitis Maternal Grandmother     Hypertension Maternal Grandfather     Diabetes Maternal Grandfather     Diabetes Paternal Grandmother     Hypertension Paternal Grandmother     Cancer Paternal Grandfather         Unspecified       Social History:       Social History     Socioeconomic History    Marital status: Single     Spouse name: Not on file    Number of children: Not on file    Years of education: Not on file    Highest education level: Not on file   Occupational History    Not on file   Tobacco Use    Smoking status: Never    Smokeless tobacco: Never   Vaping Use    Vaping status: Never Used   Substance and Sexual Activity    Alcohol use: Never    Drug use: Never    Sexual activity: Not on file   Other Topics Concern    Not on file   Social  History Narrative    Not on file     Social Determinants of Health     Financial Resource Strain: Low Risk  (4/27/2023)    Received from [facility]    Overall Financial Resource Strain (CARDIA)     Difficulty of Paying Living Expenses: Not hard at all   Food Insecurity: No Food Insecurity (4/27/2023)    Received from [facility]    Hunger Vital Sign     Worried About Running Out of Food in the Last Year: Never true     Ran Out of Food in the Last Year: Never true   Transportation Needs: No Transportation Needs (4/27/2023)    Received from [facility]    PRAPARE - Transportation     Lack of Transportation (Medical): No     Lack of Transportation (Non-Medical): No   Physical Activity: Not on file   Stress: Not on file   Social Connections: Not on file   Intimate Partner Violence: Patient Declined (4/27/2023)    Received from [facility]    Humiliation, Afraid, Rape, and Kick questionnaire     Fear of Current or Ex-Partner: Patient declined     Emotionally Abused: Patient declined     Physically Abused: Patient declined     Sexually Abused: Patient declined   Housing Stability: Low Risk  (4/27/2023)    Received from [facility]    Housing Stability Vital Sign     Unable to Pay for Housing in the Last Year: No     Number of Places Lived in the Last Year: 1     Unstable Housing in the Last Year: No         Objective:     Physical Exam:                                                                 Vitals:            Constitutional:    There were no vitals taken for this visit.  BP Readings from Last 3 Encounters:   10/10/23 134/88   07/31/23 130/92   07/17/23 138/80     Pulse Readings from Last 3 Encounters:   10/10/23 89   07/31/23 82   07/17/23 76         Well developed, well nourished, well groomed. No dysmorphic features.       Psychiatric:  Normal behavior and appropriate affect        Neurological Examination:     Mental  status/cognitive function:   Orientated to time, place and person. Recent and remote memory intact. Attention span and concentration as well as fund of knowledge are appropriate for age. Normal language and spontaneous speech.    Cranial Nerves:  II-visual fields full.   III, IV, VI-Pupils were equal, round, and reactive to light and accomodation. Extraocular movements were full and conjugate without nystagmus. Conjugate gaze, normal smooth pursuits, normal saccades   V-facial sensation symmetric.    VII-facial expression symmetric, intact forehead wrinkle, strong eye closure, symmetric smile    VIII-hearing grossly intact bilaterally   IX, X-palate elevation symmetric, no dysarthria.   XI-shoulder shrug strength intact    XII-tongue protrusion midline.    Motor Exam: symmetric bulk and tone throughout, no pronator drift. Power/strength 5/5 bilateral upper and lower extremities, no atrophy, fasciculations or abnormal movements noted.   Sensory: grossly intact light touch in all extremities.   Reflexes: brachioradialis 2+, biceps 2+, knee 2+ bilaterally  Coordination: Finger nose finger intact bilaterally, no apparent dysmetria, ataxia or tremor noted  Gait: steady casual and tandem gait.         Pertinent Imaging: No pertinent imaging available at this time         Review of Systems:     Review of Systems   Constitutional:  Negative for appetite change, fatigue and fever.   HENT: Negative.  Negative for hearing loss, tinnitus, trouble swallowing and voice change.    Eyes:  Positive for pain. Negative for photophobia and visual disturbance.   Respiratory: Negative.  Negative for shortness of breath.    Cardiovascular: Negative.  Negative for palpitations.   Gastrointestinal: Negative.  Negative for nausea and vomiting.   Endocrine: Negative.  Negative for cold intolerance.   Genitourinary: Negative.  Negative for dysuria, frequency and urgency.   Musculoskeletal:  Negative for back pain, gait problem, myalgias, neck  pain and neck stiffness.   Skin: Negative.  Negative for rash.   Allergic/Immunologic: Negative.    Neurological:  Positive for light-headedness. Negative for dizziness, tremors, seizures, syncope, facial asymmetry, speech difficulty, weakness, numbness and headaches.   Hematological: Negative.  Does not bruise/bleed easily.   Psychiatric/Behavioral: Negative.  Negative for confusion, hallucinations and sleep disturbance.    All other systems reviewed and are negative.       I have spent 60 minutes with the patient today in which greater than 50% of this time was spent in counseling/coordination of care regarding Risks and benefits of tx options, Instructions for management, Patient and family education, Importance of tx compliance, Risk factor reductions, Impressions, Counseling / Coordination of care, Documenting in the medical record, Reviewing / ordering tests, medicine, procedures  , and Obtaining or reviewing history  . I also spent 20 minutes non face to face for this patient the same day.     Activity Minutes   Precharting/reviewing 10   Patient care/counseling 60   Postcharting/care coordination 10       Author:  Regan Cortés PA-C 3/20/2024 12:16 PM

## 2024-05-17 ENCOUNTER — TELEPHONE (OUTPATIENT)
Dept: NEUROLOGY | Facility: CLINIC | Age: 24
End: 2024-05-17

## 2024-05-17 ENCOUNTER — HOSPITAL ENCOUNTER (OUTPATIENT)
Dept: MRI IMAGING | Facility: HOSPITAL | Age: 24
End: 2024-05-17
Payer: COMMERCIAL

## 2024-05-17 DIAGNOSIS — R51.9 DAILY HEADACHE: ICD-10-CM

## 2024-05-17 DIAGNOSIS — R29.818 TRANSIENT NEUROLOGICAL SYMPTOMS: ICD-10-CM

## 2024-05-17 DIAGNOSIS — M54.2 CERVICALGIA: ICD-10-CM

## 2024-05-17 PROCEDURE — 70551 MRI BRAIN STEM W/O DYE: CPT

## 2024-05-17 NOTE — TELEPHONE ENCOUNTER
Left message on machine for patient.Spoke with Dr. Mascorro the propranolol will not effect the Mri.

## 2024-05-17 NOTE — TELEPHONE ENCOUNTER
"Please advise if taking rx propranolol would cause any effect while getting MRI \"brain wo contrast\" done. Patient has an appointment today at 4:15pm,   "

## 2024-05-17 NOTE — TELEPHONE ENCOUNTER
Pt is have an MRI today @ 4:15. Pts mom called in just to ask about propranolol and taking it.   She wants to make sure it will not effect the MRI.   I did advise that she calls PCP office as they are prescribing medication but that I would send a message to our nurse as well.     Please assist.

## 2024-07-19 ENCOUNTER — TELEPHONE (OUTPATIENT)
Age: 24
End: 2024-07-19

## 2024-08-20 ENCOUNTER — TELEPHONE (OUTPATIENT)
Dept: NEUROLOGY | Facility: CLINIC | Age: 24
End: 2024-08-20

## 2024-08-20 NOTE — TELEPHONE ENCOUNTER
Called pt to r/s upcoming appointment as provider wont be in office that day and his mom stated that they would give us a call back later to schedule something.

## 2024-08-22 ENCOUNTER — OFFICE VISIT (OUTPATIENT)
Age: 24
End: 2024-08-22
Payer: COMMERCIAL

## 2024-08-22 VITALS
WEIGHT: 143 LBS | TEMPERATURE: 99.1 F | SYSTOLIC BLOOD PRESSURE: 124 MMHG | BODY MASS INDEX: 21.67 KG/M2 | HEART RATE: 92 BPM | HEIGHT: 68 IN | OXYGEN SATURATION: 99 % | DIASTOLIC BLOOD PRESSURE: 86 MMHG

## 2024-08-22 DIAGNOSIS — R59.9 LYMPH NODE ENLARGEMENT: Primary | ICD-10-CM

## 2024-08-22 PROCEDURE — 99213 OFFICE O/P EST LOW 20 MIN: CPT | Performed by: INTERNAL MEDICINE

## 2024-08-23 NOTE — PROGRESS NOTES
"Does not mean it could not have been prescribed by somebody ambulatory Visit  Name: Lester Milner      : 2000      MRN: [id number]  Encounter Provider: Renard Mascorro MD  Encounter Date: 2024   Encounter department: [facility] [place]    Assessment & Plan   1. Lymph node enlargement  Assessment & Plan:  Will obtain soft tissue ultrasound of the neck along with a few blood tests,  LDH, uric acid and CRP  Orders:  -     US head neck soft tissue; Future; Expected date: 2024  -     C-reactive protein  -     LD,Blood; Future  -     Uric acid; Future  -     LD,Blood  -     Uric acid       History of Present Illness     The patient presents to the office out of concern for a swollen lymph node on the right side of his neck which has been present for almost a year.  He has no other associated symptoms such as fevers, chills, weight loss, night sweats.  Denies any dental issues, ear issues, skin or scalp disorders.  Denies any difficulty swallowing, sore throat.  The patient states there has been no change in the size of the lymph node over the past year.        Review of Systems   Constitutional: Negative.    HENT: Negative.  Negative for ear discharge, ear pain, sore throat and trouble swallowing.    Eyes: Negative.    Respiratory: Negative.     Cardiovascular: Negative.    Gastrointestinal: Negative.    Endocrine: Negative.    Genitourinary: Negative.    Musculoskeletal: Negative.    Skin: Negative.    Allergic/Immunologic: Negative.    Neurological: Negative.    Hematological:  Positive for adenopathy (Solitary right cervical lymph node.).   Psychiatric/Behavioral: Negative.         Objective     /86 (BP Location: Left arm, Patient Position: Sitting, Cuff Size: Standard)   Pulse 92   Temp 99.1 °F (37.3 °C) (Temporal)   Ht 5' 8.39\" (1.737 m)   Wt 64.9 kg (143 lb)   SpO2 99%   BMI 21.50 kg/m²     Physical Exam  Vitals reviewed.   Constitutional:       " General: He is not in acute distress.     Appearance: Normal appearance. He is normal weight. He is not ill-appearing, toxic-appearing or diaphoretic.   HENT:      Head: Normocephalic and atraumatic.      Right Ear: Tympanic membrane normal. There is no impacted cerumen (Abundant cerumen removed from the right ear canal).      Nose: Nose normal.      Mouth/Throat:      Mouth: Mucous membranes are moist.   Eyes:      General: No scleral icterus.     Conjunctiva/sclera: Conjunctivae normal.      Pupils: Pupils are equal, round, and reactive to light.   Neck:      Vascular: No carotid bruit or JVD.      Trachea: Phonation normal. No tracheal deviation.   Cardiovascular:      Rate and Rhythm: Normal rate and regular rhythm.      Heart sounds: Normal heart sounds.   Pulmonary:      Effort: Pulmonary effort is normal. No respiratory distress.   Abdominal:      General: Abdomen is flat. There is no distension.   Musculoskeletal:      Right lower leg: No edema.      Left lower leg: No edema.   Lymphadenopathy:      Cervical: Cervical adenopathy (Solitary cervical lymph node palpable approximately 10 cm below the angle of the mandible.  Nontender freely mobile, soft) present.      Right cervical: Superficial cervical adenopathy present.   Skin:     General: Skin is warm.      Coloration: Skin is not jaundiced.      Findings: No bruising, erythema or rash.   Neurological:      General: No focal deficit present.      Mental Status: He is alert and oriented to person, place, and time. Mental status is at baseline.   Psychiatric:         Mood and Affect: Mood normal.         Behavior: Behavior normal.       Administrative Statements

## 2024-08-23 NOTE — ASSESSMENT & PLAN NOTE
Will obtain soft tissue ultrasound of the neck along with a few blood tests,  LDH, uric acid and CRP

## 2024-09-11 ENCOUNTER — TELEPHONE (OUTPATIENT)
Dept: NEUROLOGY | Facility: CLINIC | Age: 24
End: 2024-09-11

## 2024-09-11 NOTE — TELEPHONE ENCOUNTER
Third attempt to reach out to PT to r/s upcoming appointment with natalie. Pt was informed to call the office back to set up his 6 mo f/u.

## 2024-10-30 ENCOUNTER — HOSPITAL ENCOUNTER (OUTPATIENT)
Dept: ULTRASOUND IMAGING | Facility: HOSPITAL | Age: 24
Discharge: HOME | End: 2024-10-30
Attending: INTERNAL MEDICINE
Payer: COMMERCIAL

## 2024-10-30 DIAGNOSIS — R59.9 LYMPH NODE ENLARGEMENT: ICD-10-CM

## 2024-10-30 PROCEDURE — 76536 US EXAM OF HEAD AND NECK: CPT

## 2025-04-10 ENCOUNTER — TELEPHONE (OUTPATIENT)
Dept: INTERNAL MEDICINE CLINIC | Facility: OTHER | Age: 25
End: 2025-04-10

## 2025-08-13 ENCOUNTER — OFFICE VISIT (OUTPATIENT)
Dept: GASTROENTEROLOGY | Facility: CLINIC | Age: 25
End: 2025-08-13
Payer: COMMERCIAL

## 2025-08-21 ENCOUNTER — TELEPHONE (OUTPATIENT)
Dept: GASTROENTEROLOGY | Facility: HOSPITAL | Age: 25
End: 2025-08-21